# Patient Record
Sex: MALE | Race: WHITE | HISPANIC OR LATINO
[De-identification: names, ages, dates, MRNs, and addresses within clinical notes are randomized per-mention and may not be internally consistent; named-entity substitution may affect disease eponyms.]

---

## 2018-02-21 ENCOUNTER — RESULT REVIEW (OUTPATIENT)
Age: 43
End: 2018-02-21

## 2019-05-19 ENCOUNTER — EMERGENCY (EMERGENCY)
Facility: HOSPITAL | Age: 44
LOS: 0 days | Discharge: HOME | End: 2019-05-19
Admitting: EMERGENCY MEDICINE
Payer: MEDICAID

## 2019-05-19 DIAGNOSIS — F10.129 ALCOHOL ABUSE WITH INTOXICATION, UNSPECIFIED: ICD-10-CM
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
## 2019-05-19 PROCEDURE — 99285 EMERGENCY DEPT VISIT HI MDM: CPT

## 2019-11-20 ENCOUNTER — APPOINTMENT (OUTPATIENT)
Dept: ORTHOPEDIC SURGERY | Facility: CLINIC | Age: 44
End: 2019-11-20
Payer: MEDICAID

## 2019-11-20 VITALS
HEART RATE: 87 BPM | HEIGHT: 66 IN | DIASTOLIC BLOOD PRESSURE: 74 MMHG | TEMPERATURE: 99 F | WEIGHT: 150 LBS | SYSTOLIC BLOOD PRESSURE: 118 MMHG | BODY MASS INDEX: 24.11 KG/M2

## 2019-11-20 DIAGNOSIS — Z78.9 OTHER SPECIFIED HEALTH STATUS: ICD-10-CM

## 2019-11-20 PROCEDURE — 73560 X-RAY EXAM OF KNEE 1 OR 2: CPT | Mod: LT

## 2019-11-20 PROCEDURE — 99203 OFFICE O/P NEW LOW 30 MIN: CPT

## 2019-11-20 RX ORDER — SERTRALINE HYDROCHLORIDE 25 MG/1
TABLET, FILM COATED ORAL
Refills: 0 | Status: ACTIVE | COMMUNITY

## 2019-11-25 PROBLEM — Z78.9 CURRENT NON-SMOKER: Status: ACTIVE | Noted: 2019-11-20

## 2019-11-25 PROBLEM — Z78.9 DENIES ALCOHOL CONSUMPTION: Status: ACTIVE | Noted: 2019-11-20

## 2019-11-25 PROBLEM — Z78.9 DOES NOT USE ILLICIT DRUGS: Status: ACTIVE | Noted: 2019-11-20

## 2019-11-25 PROBLEM — Z78.9 NO PERTINENT PAST MEDICAL HISTORY: Status: RESOLVED | Noted: 2019-11-20 | Resolved: 2019-11-25

## 2019-11-25 NOTE — DISCUSSION/SUMMARY
[de-identified] : At this time, due to MCL sprain of the left knee, I recommended collateral hinged bracing, ice, anti-inflammatory and reassessment in 4 weeks.\par \par The patient was prescribed a rigid support Playmaker knee brace with range of motion joints.  The brace will safely protect the patient and help to facilitate healing by stabilizing and controlling the knee.  In order to prevent skin breakdown along bony prominences and to avoid compression of the peroneal nerve, a custom fit is necessary.\par

## 2019-11-25 NOTE — HISTORY OF PRESENT ILLNESS
[de-identified] : The patient comes in today with complaints of pain to his left knee.  He is status post a trip and fall on stairs.  He states he twisted his knee and had the onset of pain and swelling.  The patient states the onset/injury occurred on 11/19/2019.  The patient states the pain is sharp.  The patient describes the pain as constant. [de-identified] : Put leg straight [de-identified] : Bending knee [] : No

## 2019-11-25 NOTE — PHYSICAL EXAM
[de-identified] : Right Knee: Range of Motion in Degrees\par 	\par 	                  Claimant:    Normal:	\par Flexion Active	    135 	    135-degrees	\par Flexion Passive	    135	    135-degrees	\par Extension Active	    0-5	    0-5-degrees	\par Extension Passive	    0-5	    0-5-degrees	\par \par No weakness to flexion/extension.  No evidence of instability in the AP plane or varus or valgus stress.  Negative  Lachman.  Negative pivot shift.  Negative anterior drawer test.  Negative posterior drawer test.  Negative Kayla.  Negative Apley grind.  No medial or lateral joint line tenderness.  No tenderness over the medial and lateral facet of the patella.  No patellofemoral crepitations.  No lateral tilting patella.  No patellar apprehension.  No crepitation in the medial and lateral femoral condyle.  No proximal or distal swelling, edema or tenderness.  No gross motor or sensory deficits.  No intra-articular swelling.  2+ DP and PT pulses. No varus or valgus malalignment.  Skin is intact.  No rashes, scars or lesions.  \par  \par Left Knee: Range of Motion in Degrees	\par 	                  Claimant:	Normal:	\par Flexion Active	  135 	                135-degrees	\par Flexion Passive	  135	                135-degrees	\par Extension Active	  0-5	                0-5-degrees	\par Extension Passive	  0-5	                0-5-degrees	\par \par No weakness to flexion/extension.  No evidence of instability in the AP plane or varus stress.  Mild tenderness and a 1+ gap to valgus stress.  Negative  Lachman.  Negative pivot shift.  Negative anterior drawer test.  Negative posterior drawer test.  Negative Kayla.  Negative Apley grind.  No medial or lateral joint line tenderness.  No tenderness over the medial and lateral facet of the patella.  No patellofemoral crepitations.  No lateral tilting patella.  No patella apprehension.  No crepitation in the medial and lateral femoral condyle.  No proximal or distal swelling, edema or tenderness.  No gross motor or sensory deficits.  Mild intra-articular swelling.  2+ DP and PT pulses.  No varus or valgus malalignment.  Skin is intact.  No rashes, scars or lesions.  \par   [de-identified] : Gait and Station:  Ambulating with a slightly antalgic to antalgic gait.  Normal Station.  [de-identified] : Appearance:  Well developed, well-nourished male in no acute distress.\par   [de-identified] : Radiographs, one to two views of the left knee, show no obvious osseous abnormalities.\par

## 2019-12-12 ENCOUNTER — APPOINTMENT (OUTPATIENT)
Dept: ORTHOPEDIC SURGERY | Facility: CLINIC | Age: 44
End: 2019-12-12
Payer: MEDICAID

## 2019-12-12 VITALS
DIASTOLIC BLOOD PRESSURE: 64 MMHG | HEIGHT: 66 IN | HEART RATE: 80 BPM | BODY MASS INDEX: 24.11 KG/M2 | SYSTOLIC BLOOD PRESSURE: 99 MMHG | WEIGHT: 150 LBS

## 2019-12-12 DIAGNOSIS — S83.412A SPRAIN OF MEDIAL COLLATERAL LIGAMENT OF LEFT KNEE, INITIAL ENCOUNTER: ICD-10-CM

## 2019-12-12 PROCEDURE — 99213 OFFICE O/P EST LOW 20 MIN: CPT

## 2019-12-18 NOTE — DISCUSSION/SUMMARY
[de-identified] : At this time, due to MCL sprain of the left knee, the patient is advised to keep the brace on and return to the office in four weeks.

## 2019-12-18 NOTE — ADDENDUM
[FreeTextEntry1] : This note was dictated by Amena Yap, OTR/L, PA \par \par This note was written by Mahnaz Narvaez on 12/17/2019 acting as a scribe for JOSHUA OWUSU III, MD

## 2019-12-18 NOTE — HISTORY OF PRESENT ILLNESS
[de-identified] : The patient comes in today with a left knee MCL.  The patient states that he is in a brace.  He is feeling better in the brace.

## 2019-12-18 NOTE — PHYSICAL EXAM
[de-identified] : Left Knee: \par Range of Motion in Degrees	\par 	                  Claimant:	Normal:	\par Flexion Active	  135 	                135-degrees	\par Flexion Passive	  135	                135-degrees	\par Extension Active	  0-5	                0-5-degrees	\par Extension Passive	  0-5	                0-5-degrees	\par \par No weakness to flexion/extension.  No evidence of instability in the AP plane or varus stress.  Mild tenderness and a 1+ gap to valgus stress.  Negative  Lachman.  Negative pivot shift.  Negative anterior drawer test.  Negative posterior drawer test.  Negative Kayla.  Negative Apley grind.  No medial or lateral joint line tenderness.  No tenderness over the medial and lateral facet of the patella.  No patellofemoral crepitations.  No lateral tilting patella.  No patella apprehension.  No crepitation in the medial and lateral femoral condyle.  No proximal or distal swelling, edema or tenderness.  No gross motor or sensory deficits.  Mild intra-articular swelling.  2+ DP and PT pulses.  No varus or valgus malalignment.  Skin is intact.  No rashes, scars or lesions.  \par   [de-identified] : Ambulating with a slightly antalgic to antalgic gait.  Station:  Normal.  [de-identified] : Appearance:  Well-developed, well-nourished male in no acute distress.\par

## 2020-01-13 ENCOUNTER — APPOINTMENT (OUTPATIENT)
Dept: ORTHOPEDIC SURGERY | Facility: CLINIC | Age: 45
End: 2020-01-13
Payer: MEDICAID

## 2020-01-13 VITALS
BODY MASS INDEX: 25.71 KG/M2 | DIASTOLIC BLOOD PRESSURE: 65 MMHG | TEMPERATURE: 98.7 F | HEART RATE: 76 BPM | SYSTOLIC BLOOD PRESSURE: 99 MMHG | WEIGHT: 160 LBS | HEIGHT: 66 IN

## 2020-01-13 DIAGNOSIS — G89.29 PAIN IN LEFT KNEE: ICD-10-CM

## 2020-01-13 DIAGNOSIS — S83.207A UNSPECIFIED TEAR OF UNSPECIFIED MENISCUS, CURRENT INJURY, LEFT KNEE, INITIAL ENCOUNTER: ICD-10-CM

## 2020-01-13 DIAGNOSIS — M25.562 PAIN IN LEFT KNEE: ICD-10-CM

## 2020-01-13 PROCEDURE — 99213 OFFICE O/P EST LOW 20 MIN: CPT

## 2020-01-16 NOTE — DISCUSSION/SUMMARY
[de-identified] : The patient presents with a healed MCL with possible meniscal tear, left knee.  At this time I am going to recommend he undergo an MRI.  He will be reassessed after the MRI.

## 2020-01-16 NOTE — PHYSICAL EXAM
[de-identified] : Left knee:\par Knee: Range of Motion in Degrees	\par 	                  Claimant:	Normal:	\par Flexion Active	  135 	                135-degrees	\par Flexion Passive	  135	                135-degrees	\par Extension Active	  0-5	                0-5-degrees	\par Extension Passive	  0-5	                0-5-degrees	\par \par No weakness to flexion/extension.  No evidence of instability in the AP plane or varus or valgus stress.  Negative  Lachman.  Negative pivot shift.  Negative anterior drawer test.  Negative posterior drawer test.  Positive medial joint line tenderness.  Negative lateral joint line tenderness.  Positive Kayla.  Positive Apley grind.  No tenderness over the medial and lateral facet of the patella.  No patellofemoral crepitations.  No lateral tilting patella.  No patella apprehension.  No crepitation in the medial and lateral femoral condyle.  No proximal or distal swelling, edema or tenderness.  No gross motor or sensory deficits.  Mild intra-articular swelling.  2+ DP and PT pulses.  No varus or valgus malalignment.  Skin is intact.  No rashes, scars or lesions.   \par   [de-identified] : Gait: Slightly antalgic to antalgic gait.  Station: Normal  [de-identified] : Appearance: Well-developed, well-nourished male  in no acute distress.

## 2020-01-16 NOTE — ADDENDUM
[FreeTextEntry1] : This note was written by Aneta Martin on 01/14/2020 acting as scribe for Brad Ervin III, MD

## 2022-08-11 NOTE — ADDENDUM
Attempted to reach patient via telephone in regards to the message below. Patient did not answer, voicemail left encouraging patient to call back to discuss further.     [FreeTextEntry1] : This note was written by [place] Aparicio on 11/25/2019, acting as a scribe for Brad Ervin III, MD

## 2022-10-28 ENCOUNTER — EMERGENCY (EMERGENCY)
Facility: HOSPITAL | Age: 47
LOS: 0 days | Discharge: HOME | End: 2022-10-28
Attending: STUDENT IN AN ORGANIZED HEALTH CARE EDUCATION/TRAINING PROGRAM
Payer: MEDICAID

## 2022-10-28 VITALS
HEART RATE: 85 BPM | DIASTOLIC BLOOD PRESSURE: 86 MMHG | OXYGEN SATURATION: 99 % | RESPIRATION RATE: 18 BRPM | SYSTOLIC BLOOD PRESSURE: 141 MMHG | TEMPERATURE: 99 F

## 2022-10-28 VITALS — WEIGHT: 184.97 LBS

## 2022-10-28 DIAGNOSIS — S09.90XA UNSPECIFIED INJURY OF HEAD, INITIAL ENCOUNTER: ICD-10-CM
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
## 2022-10-28 DIAGNOSIS — S01.01XA LACERATION WITHOUT FOREIGN BODY OF SCALP, INITIAL ENCOUNTER: ICD-10-CM

## 2022-10-28 DIAGNOSIS — F07.81 POSTCONCUSSIONAL SYNDROME: ICD-10-CM
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
## 2022-10-28 PROCEDURE — 12002 RPR S/N/AX/GEN/TRNK2.6-7.5CM: CPT

## 2022-10-28 PROCEDURE — 99284 EMERGENCY DEPT VISIT MOD MDM: CPT | Mod: 25

## 2022-10-28 PROCEDURE — 70450 CT HEAD/BRAIN W/O DYE: CPT | Mod: MA

## 2022-10-28 PROCEDURE — 70450 CT HEAD/BRAIN W/O DYE: CPT | Mod: 26,MA

## 2022-10-28 NOTE — ED STATDOCS - OBJECTIVE STATEMENT
48 y/o male presents to the ED s/p head injury. Pt was at work when a ladder fell on his head. No LOC. Tdap UTD. +scalp laceration. No other complaints at this time.

## 2022-10-28 NOTE — ED STATDOCS - ATTENDING APP SHARED VISIT CONTRIBUTION OF CARE
I, Reanna Luz DO,  performed the initial face to face bedside interview with this patient regarding history of present illness, review of symptoms and relevant past medical, social and family history.  I completed an independent physical examination.  I was the initial provider who evaluated this patient.   I personally saw the patient and performed a substantive portion of the visit including all aspects of the medical decision making.  I have signed out the follow up of any pending tests (i.e. labs, radiological studies) to the ACP.  I have communicated the patient’s plan of care and disposition with the ACP.  The history, relevant review of systems, past medical and surgical history, medical decision making, and physical examination was documented by the scribe in my presence and I attest to the accuracy of the documentation.

## 2022-10-28 NOTE — ED STATDOCS - PROGRESS NOTE DETAILS
pt here with head lac, ladder fell on head. pt denies any LOC, neck pain or any other complaints. pt lac repaired will return to ED for staples removal in 7 days. -Tamara Chris PA-C pt here with head lac, ladder fell on head. pt denies any LOC, neck pain or any other complaints. pt lac repaired will return to ED for staples removal in 14 days. -Tamara Chris PA-C

## 2022-10-28 NOTE — ED STATDOCS - CARE PROVIDER_API CALL
Bibiana Marks (MD)  Clinical Neurophysiology; EEGEpilepsy; Neurology; Sleep Medicine  [address] [facility], [place] [address]  Phone: [phone]  Fax: [phone]  Follow Up Time: Urgent

## 2022-10-28 NOTE — ED ADULT TRIAGE NOTE - CHIEF COMPLAINT QUOTE
pt to ED for head laceration on his right scalp after a ladder fell and hit his head.  no LOC, no blood thinners.

## 2022-10-28 NOTE — ED STATDOCS - PATIENT PORTAL LINK FT
You can access the FollowMyHealth Patient Portal offered by [facility] by registering at the following website: [email or web address]. By joining [facility]’s FollowMyHealth portal, you will also be able to view your health information using other applications (apps) compatible with our system.

## 2022-10-28 NOTE — ED STATDOCS - NSFOLLOWUPINSTRUCTIONS_ED_ALL_ED_FT
Post-Concussion Syndrome    A series of images showing how the quick head movements of a concussion injure the brain.   A concussion is a brain injury from a direct hit to the head or body. This hit causes the brain to shake quickly back and forth inside the skull. This can damage brain cells and cause chemical changes in the brain. Concussions are usually not life-threatening but can cause serious symptoms.    Post-concussion syndrome is when symptoms that occur after a concussion last longer than normal. These symptoms can last from weeks to months.      What are the causes?    The cause of this condition is not known. It can happen whether your head injury was mild or severe.      What increases the risk?    You are more likely to develop this condition if:  •You are female.      •You are a child, teen, or young adult.      •You have had a past head injury.      •You have a history of headaches.      •You have depression or anxiety.      •You have loss of consciousness or cannot remember the event (have amnesia of the event).      •You have multiple symptoms or severe symptoms at the time of your concussion.        What are the signs or symptoms?    Symptoms of this condition include:•Physical symptoms. You may have:  •Headaches.      •Tiredness.      •Dizziness and weakness.      •Blurry vision and sensitivity to light.      •Hearing difficulties.      •Problems with balance.      •Mental and emotional symptoms. You may have:  •Memory problems and trouble concentrating.      •Difficulty sleeping or staying asleep.      •Feelings of irritability.      •Anxiety or depression.      •Difficulty learning new things.          How is this diagnosed?    This condition may be diagnosed based on:  •Your symptoms.      •A description of your injury.      •Your medical history.      •Testing your strength, balance, and nerve function (neurological examination).      Your health care provider may order other tests, including brain imaging such as a CT scan or an MRI, and memory testing (neuropsychological testing).      How is this treated?    Treatment for this condition may depend on your symptoms. Symptoms usually go away on their own over time. Treatments may include:  •Medicines for headaches, anxiety, depression, and trouble sleeping (insomnia).      •Resting your brain and body for a few days after your injury.    •Rehabilitation therapy, such as:  •Physical or occupational therapy. This may include exercises to help with balance and dizziness.      •Mental health counseling. A form of talk therapy called cognitive behavioral therapy (CBT) can be especially helpful. This therapy helps you set goals and follow up on the changes that you make.      •Speech therapy.      •Vision therapy. A brain and eye specialist can recommend treatments for vision problems.          Follow these instructions at home:    Medicines     •Take over-the-counter and prescription medicines only as told by your health care provider.      •Avoid opioid prescription pain medicines when recovering from a concussion.      Activity   •Limit your mental activities for the first few days after your injury. This may include not doing the following:  •Homework or job-related work.      •Complex thinking.      •Watching TV, and using a computer or phone.      •Playing memory games and puzzles.        •Gradually return to your normal activity level. If a certain activity brings on your symptoms, stop or slow down until you can do the activity without it triggering your symptoms.      •Limit physical activity, such as exercise or sports, for the first few days after a concussion. Gradually return to normal activity as told by your health care provider.    •Rest. Rest helps your brain heal. Make sure you:  •Get plenty of sleep at night. Most adults should get at least 7–9 hours of sleep each night.      •Rest during the day. Take naps or rest breaks when you feel tired.        • Do not do high-risk activities that could cause a second concussion, such as riding a bike or playing sports. Having another concussion before the first one has healed can be dangerous.        General instructions   A bottle of beer, a glass of wine, and a glass of hard liquor with a "do not drink" sign over them.   • Do not drink alcohol until your health care provider says that you can.      •Keep track of the frequency and the severity of your symptoms. Give this information to your health care provider.      •Keep all follow-up visits as directed by your health care provider. This is important. This includes visits with specialists.        Contact a health care provider if:    •Your symptoms do not improve.      •You have another injury.        Get help right away if you:    •Have a severe or worsening headache.      •Are confused.      •Have trouble staying awake.      •Faint.      •Vomit.      •Have weakness or numbness in any part of your body.      •Have a seizure.      •Have trouble speaking.        Summary    •Post-concussion syndrome is when symptoms that occur after a concussion last longer than normal.      •Symptoms usually go away on their own over time. Depending on your symptoms, you may need treatment, such as medicines or rehabilitation therapy.      •Rest your brain and body for a few days after your injury. Gradually return to normal activities as told by your health care provider.      •Get plenty of sleep, and avoid alcohol and opioid pain medicines while recovering from a concussion.      This information is not intended to replace advice given to you by your health care provider. Make sure you discuss any questions you have with your health care provider.      Laceration    A laceration is a cut that goes through all of the layers of the skin and into the tissue that is right under the skin. Some lacerations heal on their own. Others need to be closed with skin adhesive strips, skin glue, stitches (sutures), or staples. Proper laceration care minimizes the risk of infection and helps the laceration to heal better.  If non-absorbable stitches or staples have been placed, they must be taken out within the time frame instructed by your healthcare provider.    SEEK IMMEDIATE MEDICAL CARE IF YOU HAVE ANY OF THE FOLLOWING SYMPTOMS: swelling around the wound, worsening pain, drainage from the wound, red streaking going away from your wound, inability to move finger or toe near the laceration, or discoloration of skin near the laceration.      return in 7 days for staple removal Post-Concussion Syndrome    A series of images showing how the quick head movements of a concussion injure the brain.   A concussion is a brain injury from a direct hit to the head or body. This hit causes the brain to shake quickly back and forth inside the skull. This can damage brain cells and cause chemical changes in the brain. Concussions are usually not life-threatening but can cause serious symptoms.    Post-concussion syndrome is when symptoms that occur after a concussion last longer than normal. These symptoms can last from weeks to months.      What are the causes?    The cause of this condition is not known. It can happen whether your head injury was mild or severe.      What increases the risk?    You are more likely to develop this condition if:  •You are female.      •You are a child, teen, or young adult.      •You have had a past head injury.      •You have a history of headaches.      •You have depression or anxiety.      •You have loss of consciousness or cannot remember the event (have amnesia of the event).      •You have multiple symptoms or severe symptoms at the time of your concussion.        What are the signs or symptoms?    Symptoms of this condition include:•Physical symptoms. You may have:  •Headaches.      •Tiredness.      •Dizziness and weakness.      •Blurry vision and sensitivity to light.      •Hearing difficulties.      •Problems with balance.      •Mental and emotional symptoms. You may have:  •Memory problems and trouble concentrating.      •Difficulty sleeping or staying asleep.      •Feelings of irritability.      •Anxiety or depression.      •Difficulty learning new things.          How is this diagnosed?    This condition may be diagnosed based on:  •Your symptoms.      •A description of your injury.      •Your medical history.      •Testing your strength, balance, and nerve function (neurological examination).      Your health care provider may order other tests, including brain imaging such as a CT scan or an MRI, and memory testing (neuropsychological testing).      How is this treated?    Treatment for this condition may depend on your symptoms. Symptoms usually go away on their own over time. Treatments may include:  •Medicines for headaches, anxiety, depression, and trouble sleeping (insomnia).      •Resting your brain and body for a few days after your injury.    •Rehabilitation therapy, such as:  •Physical or occupational therapy. This may include exercises to help with balance and dizziness.      •Mental health counseling. A form of talk therapy called cognitive behavioral therapy (CBT) can be especially helpful. This therapy helps you set goals and follow up on the changes that you make.      •Speech therapy.      •Vision therapy. A brain and eye specialist can recommend treatments for vision problems.          Follow these instructions at home:    Medicines     •Take over-the-counter and prescription medicines only as told by your health care provider.      •Avoid opioid prescription pain medicines when recovering from a concussion.      Activity   •Limit your mental activities for the first few days after your injury. This may include not doing the following:  •Homework or job-related work.      •Complex thinking.      •Watching TV, and using a computer or phone.      •Playing memory games and puzzles.        •Gradually return to your normal activity level. If a certain activity brings on your symptoms, stop or slow down until you can do the activity without it triggering your symptoms.      •Limit physical activity, such as exercise or sports, for the first few days after a concussion. Gradually return to normal activity as told by your health care provider.    •Rest. Rest helps your brain heal. Make sure you:  •Get plenty of sleep at night. Most adults should get at least 7–9 hours of sleep each night.      •Rest during the day. Take naps or rest breaks when you feel tired.        • Do not do high-risk activities that could cause a second concussion, such as riding a bike or playing sports. Having another concussion before the first one has healed can be dangerous.        General instructions   A bottle of beer, a glass of wine, and a glass of hard liquor with a "do not drink" sign over them.   • Do not drink alcohol until your health care provider says that you can.      •Keep track of the frequency and the severity of your symptoms. Give this information to your health care provider.      •Keep all follow-up visits as directed by your health care provider. This is important. This includes visits with specialists.        Contact a health care provider if:    •Your symptoms do not improve.      •You have another injury.        Get help right away if you:    •Have a severe or worsening headache.      •Are confused.      •Have trouble staying awake.      •Faint.      •Vomit.      •Have weakness or numbness in any part of your body.      •Have a seizure.      •Have trouble speaking.        Summary    •Post-concussion syndrome is when symptoms that occur after a concussion last longer than normal.      •Symptoms usually go away on their own over time. Depending on your symptoms, you may need treatment, such as medicines or rehabilitation therapy.      •Rest your brain and body for a few days after your injury. Gradually return to normal activities as told by your health care provider.      •Get plenty of sleep, and avoid alcohol and opioid pain medicines while recovering from a concussion.      This information is not intended to replace advice given to you by your health care provider. Make sure you discuss any questions you have with your health care provider.      Laceration    A laceration is a cut that goes through all of the layers of the skin and into the tissue that is right under the skin. Some lacerations heal on their own. Others need to be closed with skin adhesive strips, skin glue, stitches (sutures), or staples. Proper laceration care minimizes the risk of infection and helps the laceration to heal better.  If non-absorbable stitches or staples have been placed, they must be taken out within the time frame instructed by your healthcare provider.    SEEK IMMEDIATE MEDICAL CARE IF YOU HAVE ANY OF THE FOLLOWING SYMPTOMS: swelling around the wound, worsening pain, drainage from the wound, red streaking going away from your wound, inability to move finger or toe near the laceration, or discoloration of skin near the laceration.      return in 14 days for staple removal

## 2025-02-02 NOTE — ED STATDOCS - CHPI ED SYMPTOM NEG
I have personally provided the amount of critical care time documented below excluding time spent on separate procedures. no fever No